# Patient Record
Sex: FEMALE | URBAN - METROPOLITAN AREA
[De-identification: names, ages, dates, MRNs, and addresses within clinical notes are randomized per-mention and may not be internally consistent; named-entity substitution may affect disease eponyms.]

---

## 2017-03-07 ENCOUNTER — VIRTUAL VISIT (OUTPATIENT)
Dept: FAMILY MEDICINE | Facility: OTHER | Age: 52
End: 2017-03-07

## 2017-03-07 NOTE — PROGRESS NOTES
Date:   Clinician: Joel Wegener  Clinician NPI: [id number]  Patient: Alexa Solorio  Patient : 1965  Patient Address: [address] [place] [id number]  Patient Phone: [phone]  Visit Protocol: UTI  Patient Summary:  Alexa is a 51 year old ( : 1965 ) female who initiated a Zip for a presumed bladder infection.     Her symptoms began yesterday and consist of hesitation, dysuria, urinary incontinence, urgency, and urinary frequency.   Symptom Details   Urinary Frequency: Up to every 5 minutes    She denies abdominal pain, vomiting, nausea, hematuria, foul smelling urine, recent antibiotic use, flank pain, vaginal discharge, loss of appetite, chills, and fever. She has not been hospitalized, been a patient in a nursing home, or had a catheter in the past two weeks. She denies risk factors for sexually transmitted infections.   She has a history of kidney stones. Her last episode was more than 6 months ago.   Alexa has had one (1) UTI in the past 12 months. Her most recent bladder infection was not within the last 4 weeks. Her current symptoms are similar to the previous UTI symptoms. She took ciprofloxacin for her last infection and found it to be effective.    Alexa typically gets yeast infections when she takes antibiotics.  She states she is not pregnant and denies breastfeeding. She has menstruated in the past month.   She smokes or uses smokeless tobacco.   MEDICATIONS:  Venlafaxine (Effexor)  , ALLERGIES:  NKDA   Clinician Response:  Dear Alexa,  Based on the information you have provided, you likely have a bladder infection, also called an acute urinary tract infection (UTI).   To treat your infection, I am prescribing:   Bactrim DS. Swallow one (1) tablet twice a day for 3 days to treat your bladder infection. Continue taking the tablets even if you feel better before all the medication is gone. There is no refill with this prescription.   Antibiotic selections by the  clinician are based on safety and effectiveness. You may or may not be prescribed the same medication that you took for your last bladder infection.   Some people develop allergies to antibiotics. If you notice a new rash, significant swelling, or difficulty breathing, stop the medication immediately and go into a clinic for physical evaluation.   To help treat your current UTI and prevent future occurrences, remember to:     Drink 8-10, 8-ounce glasses of water daily.    Urinate after sexual intercourse.    Wipe front to back after using the bathroom.     Some women may develop a yeast infection as a side effect of taking antibiotics. Because you noted that you typically get yeast infections when you are taking antibiotics, I am also prescribing:   Fluconazole (Diflucan) 150 mg oral tablet. Take this medication only if you notice symptoms of a yeast infection (vaginal discharge that is white, thick, and odorless). There are no refills with this prescription.   You should visit a clinic for a follow-up visit if your symptoms do not improve in 1-2 days or if you experience another urinary tract infection soon after completing this treatment.  If you become pregnant during this course of treatment, stop taking the medication and contact your primary care clinician.   Finally, as your clinician, I need you to know that becoming tobacco-free is the most important thing you can do to protect your current and future health.   Diagnosis: Acute Uncomplicated Bladder Infection  Diagnosis ICD: N39.0  Additional Clinician Notes: ciprofloxacin no longer recommended as first line due to risks per FDA so sent in rx for bactrim instead which should work very well.   Prescription: sulfamethoxazole-TMP DS (Bactrim DS) 800-160mg oral tablet 6 tablets, 3 days supply. Take one tablet by mouth two times a day for 3 days. Refills: 0, Refill as needed: no, Allow substitutions: yes  Prescription: fluconazole (Diflucan) 150mg oral tablet 1  tablet, 1 days supply. Take one tablet by mouth one time a day for 1 day. Refills: 0, Refill as needed: no, Allow substitutions: yes  Prescription Sent At: March 07 10:57:24, 2017  Pharmacy: [id number] - [phone] [address] [id number]